# Patient Record
Sex: MALE | Race: WHITE | NOT HISPANIC OR LATINO | Employment: OTHER | URBAN - METROPOLITAN AREA
[De-identification: names, ages, dates, MRNs, and addresses within clinical notes are randomized per-mention and may not be internally consistent; named-entity substitution may affect disease eponyms.]

---

## 2018-02-27 ENCOUNTER — TELEPHONE (OUTPATIENT)
Dept: OTOLARYNGOLOGY | Facility: CLINIC | Age: 73
End: 2018-02-27

## 2018-02-27 NOTE — TELEPHONE ENCOUNTER
----- Message from Madonna Shah sent at 2/27/2018  1:28 PM CST -----  Contact: Sharyn ([facility])  x_  1st Request  _  2nd Request  _  3rd Request     Who:Sharyn ([facility])    Why: Sharyn is checking on the status of a fax for signed prescription that was sent on 2/19/18    What Number to Call Back:[phone]      When to Expect a call back: (Within 24 hours)    Please return the call at earliest convenience. Thanks!

## 2018-02-27 NOTE — TELEPHONE ENCOUNTER
Call Mr Wilson back to inform him that Dr Traylor office is not in today and that  Parker will give him a return call on 02/28

## 2018-03-12 ENCOUNTER — OFFICE VISIT (OUTPATIENT)
Dept: OTOLARYNGOLOGY | Facility: CLINIC | Age: 73
End: 2018-03-12
Payer: MEDICARE

## 2018-03-12 VITALS
HEART RATE: 71 BPM | SYSTOLIC BLOOD PRESSURE: 132 MMHG | TEMPERATURE: 98 F | HEIGHT: 74 IN | DIASTOLIC BLOOD PRESSURE: 76 MMHG

## 2018-03-12 DIAGNOSIS — Z87.19 HX OF GASTROESOPHAGEAL REFLUX (GERD): ICD-10-CM

## 2018-03-12 DIAGNOSIS — G47.33 OSA (OBSTRUCTIVE SLEEP APNEA): Primary | ICD-10-CM

## 2018-03-12 DIAGNOSIS — J30.89 ALLERGIC RHINITIS DUE TO OTHER ALLERGIC TRIGGER, UNSPECIFIED CHRONICITY, UNSPECIFIED SEASONALITY: ICD-10-CM

## 2018-03-12 DIAGNOSIS — Z86.711 HISTORY OF PULMONARY EMBOLUS (PE): ICD-10-CM

## 2018-03-12 DIAGNOSIS — J34.2 NASAL SEPTAL DEVIATION: ICD-10-CM

## 2018-03-12 DIAGNOSIS — I10 ESSENTIAL HYPERTENSION: ICD-10-CM

## 2018-03-12 PROCEDURE — 99204 OFFICE O/P NEW MOD 45 MIN: CPT | Mod: S$GLB,,, | Performed by: OTOLARYNGOLOGY

## 2018-03-12 RX ORDER — CITALOPRAM 10 MG/1
10 TABLET ORAL DAILY
COMMUNITY

## 2018-03-12 RX ORDER — FUROSEMIDE 40 MG/1
40 TABLET ORAL 2 TIMES DAILY
COMMUNITY

## 2018-03-12 RX ORDER — IRBESARTAN 150 MG/1
150 TABLET ORAL NIGHTLY
COMMUNITY

## 2018-03-12 RX ORDER — FLUOROURACIL 50 MG/G
CREAM TOPICAL 2 TIMES DAILY
COMMUNITY

## 2018-03-12 RX ORDER — AZELASTINE 1 MG/ML
2 SPRAY, METERED NASAL 2 TIMES DAILY
Qty: 30 ML | Refills: 3 | Status: SHIPPED | OUTPATIENT
Start: 2018-03-12

## 2018-03-12 RX ORDER — OMEPRAZOLE 20 MG/1
20 CAPSULE, DELAYED RELEASE ORAL DAILY
COMMUNITY

## 2018-03-12 RX ORDER — ALLOPURINOL 100 MG/1
100 TABLET ORAL DAILY
COMMUNITY

## 2018-03-12 RX ORDER — CLOTRIMAZOLE AND BETAMETHASONE DIPROPIONATE 10; .64 MG/G; MG/G
CREAM TOPICAL 2 TIMES DAILY
COMMUNITY

## 2018-03-12 RX ORDER — METOPROLOL SUCCINATE 50 MG/1
50 TABLET, EXTENDED RELEASE ORAL DAILY
COMMUNITY

## 2018-03-12 NOTE — PATIENT INSTRUCTIONS
New CPAP machine ordered.  Continue use whenever sleeping.  Pt to call / notify our office if not received within the next few days.  Follow up after 4 weeks of use.   Azelastine nasal spray 2 sprays in each nostril twice a day as needed.

## 2018-03-13 NOTE — PROGRESS NOTES
Subjective:       Patient ID: Andrea Wilson is a 72 y.o. male.    Chief Complaint: Follow-up (sleep apnea needs new cpap machine)    He is an existing patient of mine last seen in February 2015 for nasal infection with nasal congestion as well as for CPAP supplies.  He has a long history of ADRIENNE on CPAP nightly with good tolerance and good results for many years.  His last PSG in our archived records was in 2000 when his weight was 270 pounds and presently 280 pounds.  See scanned copy in Epic.  He states he uses a Krishnan FX nasal pillows large as his interface for many years with good tolerance.  There is history of nasal septal deviation which he denies any associated symptoms.  He reports tolerating his nasal CPAP well, keeping the mask on all night, no snoring or ADRIENNE symptoms with it in place, feels rested in the morning with no morning headache, and no daytime fatigue.  There is history of GERD but not in some time and has not disturbed sleep.  There is history of occasional hayfever symptoms for which he has taken azelastine nasal spray as needed and has worked well.  Recently he has had problems with the CPAP device turning itself off at various times, but generally this occurs at some point during the night or every other night and he awakens and has to turn machine back on.  He developed pulmonary emboli approximately 6 months ago for which he is now on Eliquis.  He sees his PCP and other physicians regularly at [facility].  He states he has never used tobacco.  There are no other otolaryngologic complaints.          Review of Systems   Ears: Negative for hearing loss, ear pain, ear pressure, ringing in ear, ear discharge, ear infections, dizziness, head trauma, taken gentramycin/streptomycin and family history of hearing loss.    Nose:  Negative for nosebleeds, nasal obstruction, nasal or sinus surgery, loss of smell and postnasal drip.    Mouth/Throat: Negative for pain  swallowing, impaired swallowing, hoarse voice, throat mass, neck mass, oral ulcers and neck lumps.   Constitutional: Negative for recent unexplained weight loss, fever, chills and night sweats.    Eyes:  Negative for history of glaucoma and visual change.   Cardiovascular:  Positive for history of high blood pressure. Negative for chest pain and palpitations.   Respiratory:  Positive for recent cough. Negative for asthma, emphysema, history of tuberculosis and shortness of breath.    Gastrointestinal:  Positive for acid reflux and indigestion. Negative for history of stomach ulcers or pain, blood in stool and change in stool.   Other:  Negative for kidney problem, bladder problem, prostate disease, arthritis, new or changing moles, rash, weakness, disturbances in coordination, slurred, confusion, heat intolerance, cold intolerance, swollen glands, anemia and persistent infections.           Objective:      Physical Exam   Constitutional: He is oriented to person, place, and time. He appears well-developed and well-nourished. No distress.   HENT:   Head: Normocephalic and atraumatic.   Right Ear: Tympanic membrane, external ear and ear canal normal.   Left Ear: Tympanic membrane, external ear and ear canal normal.   Nose: Septal deviation present. No mucosal edema or rhinorrhea. No epistaxis.   Mouth/Throat: Uvula is midline, oropharynx is clear and moist and mucous membranes are normal. No oral lesions. No trismus in the jaw. No uvula swelling. No oropharyngeal exudate, posterior oropharyngeal edema or posterior oropharyngeal erythema.   Eyes: Conjunctivae are normal. Right eye exhibits no discharge. Left eye exhibits no discharge. No scleral icterus.   Neck: Phonation normal. Neck supple. No tracheal deviation present. No thyromegaly present.   Cardiovascular: Normal rate and regular rhythm.    Pulmonary/Chest: Effort normal and breath sounds normal. No stridor. No respiratory distress. He has no wheezes. He has no  rales.   Musculoskeletal: Normal range of motion. He exhibits no edema or deformity.   Lymphadenopathy:     He has no cervical adenopathy.   Neurological: He is alert and oriented to person, place, and time. He displays no weakness. Coordination normal.   Skin: Skin is warm and dry. No rash noted. He is not diaphoretic.   Psychiatric: He has a normal mood and affect. His behavior is normal. His speech is not slurred.       Assessment:       1. ADRIENNE (obstructive sleep apnea)    2. Allergic rhinitis due to other allergic trigger, unspecified chronicity, unspecified seasonality    3. Nasal septal deviation    4. BMI 35.0-35.9,adult    5. Essential hypertension    6. History of pulmonary embolus (PE)    7. Hx of gastroesophageal reflux (GERD)        Plan:        Reviewed all above with patient and considerations and recommendations and answered questions.  Ordered new CPAP machine as well as replacement supplies.  Reordered azelastine nasal spray and reviewed use 2 sprays in each nostril twice a day as needed.  Epistaxis precautions reviewed especially in light of his current anticoagulant.  Understands weight management and its relationship to ADRIENNE as well as his recommended pressure settings.  Understands and reviewed signs and symptoms of untreated or inadequately treated ADRIENNE.  Patient to call if he does not receive his new machine in the next few days.  Otherwise follow-up in 1 month.

## 2018-03-22 ENCOUNTER — TELEPHONE (OUTPATIENT)
Dept: OTOLARYNGOLOGY | Facility: CLINIC | Age: 73
End: 2018-03-22

## 2018-03-22 NOTE — TELEPHONE ENCOUNTER
"----- Message from Sofy Mireles sent at 3/22/2018 10:26 AM CDT -----  Contact: Patient himself  X 1st Request  _  2nd Request  _  3rd Request    Who: Andrea Wilson (mrn# [id number])     Why: Patient called requesting a call. Says, "he has not heard from [facility] yet as this pertains to his CPAP machine."      Please give a call back at your earliest convenience.     THANKS!       What Number to Call Back:  [phone]                              "

## 2018-03-22 NOTE — TELEPHONE ENCOUNTER
Spoke with patients wife told [facility] came in the other day and I gave her all information on Mr. Wilson she said she would take care of

## 2018-04-03 ENCOUNTER — TELEPHONE (OUTPATIENT)
Dept: OTOLARYNGOLOGY | Facility: CLINIC | Age: 73
End: 2018-04-03

## 2018-04-03 NOTE — TELEPHONE ENCOUNTER
----- Message from Arcelia Bajwa sent at 4/3/2018 10:13 AM CDT -----  Contact: pt's wife lolis [phone]        Name of Who is Calling: pt's wife lolis [phone]      What is the request in detail: they think they left the pt's insurance cards there at the office. Please call the wife      Can the clinic reply by [facility]:pt's wife lolis [phone]      What Number to Call Back if not in [facility]:

## 2018-04-03 NOTE — TELEPHONE ENCOUNTER
Talk with Mrs Wilson she understand about the CPAP information she just need to call insurance company for not prior aut but for a claim number.

## 2018-04-03 NOTE — TELEPHONE ENCOUNTER
"----- Message from Sofy Mireles sent at 4/3/2018  2:55 PM CDT -----  Contact: Patient's wife   X  1st Request  _  2nd Request  _  3rd Request    Patient:  Andrea Wilson (mrn# [id number])     Why: Patient's wife called requesting a call. Says, "she still hasn't heard any thing from [facility] yet as this pertains to patient's CPAP machine."  Please give a call back at your earliest convenience.       THANKS!     What Number to Call Back: [phone]                              "

## 2018-04-19 ENCOUNTER — TELEPHONE (OUTPATIENT)
Dept: OTOLARYNGOLOGY | Facility: CLINIC | Age: 73
End: 2018-04-19

## 2018-04-19 NOTE — TELEPHONE ENCOUNTER
Spoke with patients wife re his CPAP machine he has not gotten a call from [facility]/Faxed all again to [facility] with Urgency note with all papers

## 2018-04-19 NOTE — TELEPHONE ENCOUNTER
----- Message from Ino Lobato sent at 4/19/2018  3:56 PM CDT -----  Contact: patients wife            Name of Who is Calling: Rehan      What is the request in detail: patient wife is requesting a call back in reference to the patient is still having an issue getting the cpap from [facility].      Can the clinic reply by [facility]: no      What Number to Call Back [facility] not [facility]: [phone]

## 2018-05-04 ENCOUNTER — TELEPHONE (OUTPATIENT)
Dept: OTOLARYNGOLOGY | Facility: CLINIC | Age: 73
End: 2018-05-04

## 2018-05-04 NOTE — TELEPHONE ENCOUNTER
----- Message from Ino Lobato sent at 5/4/2018 11:53 AM CDT -----  Contact: patients wife            Name of Who is Calling: Vanesa      What is the request in detail: patients wife is requesting a call back in reference to they still haven't received his cpap machine and the provider is requesting additional information.      Can the clinic reply by [facility]: no      What Number to Call Back [facility] not in [facility]: [phone]

## 2018-05-07 NOTE — TELEPHONE ENCOUNTER
Spoke with Marcela at [facility] told of problems having trouble with  getting patients machine maintenced or replaced  and  New supplies been since March and still had not heard from them Marcela now states needed face to face office notes to supply him, faxed over notes and spoke with patient

## 2018-05-17 ENCOUNTER — TELEPHONE (OUTPATIENT)
Dept: OTOLARYNGOLOGY | Facility: CLINIC | Age: 73
End: 2018-05-17

## 2018-05-17 NOTE — TELEPHONE ENCOUNTER
Attempted to call [phone] ext. [phone] letting them know fax was sent to number [phone]. re faxed today per Dr. Traylor.

## 2018-05-17 NOTE — TELEPHONE ENCOUNTER
----- Message from Ino Lobato sent at 5/17/2018  3:46 PM CDT -----  Contact: Martin             Name of Who is Calling: wyatt      What is the request in detail: Martin is faxing a form to the clinic and is requesting that the clinical notes and the form from office visit 3/12/18 be signed by Dr. Traylor and faxed back to [phone]      Can the clinic reply by [facility]: no      What Number to Call Back [facility] not in [facility]: [phone] ext [phone]

## 2018-06-19 ENCOUNTER — TELEPHONE (OUTPATIENT)
Dept: OTOLARYNGOLOGY | Facility: CLINIC | Age: 73
End: 2018-06-19

## 2018-06-19 NOTE — TELEPHONE ENCOUNTER
----- Message from Arcelia Bajwa sent at 6/19/2018  9:33 AM CDT -----  Contact: pt's wife lolis [phone]            Name of Who is Calling: pt's mom lolis [phone]      What is the request in detail: wife states that the cpap supplies people needs verification. Call wife      Can the clinic reply by [facility]: no      What Number to Call Back if not in [facility]: pt's wife lolis [phone]

## 2018-06-21 ENCOUNTER — TELEPHONE (OUTPATIENT)
Dept: OTOLARYNGOLOGY | Facility: CLINIC | Age: 73
End: 2018-06-21

## 2018-06-21 NOTE — TELEPHONE ENCOUNTER
Spoke with Yohana with [facility] regarding Mr Wilson. She received orders on 6/14/18 for CPAP machine and supplies. The orders are over 90 days. She needs a current order for CPAP and supplies with a signed office note. Informed her Dr Traylor is not in the office today. She will be in on Monday. We can send updated signed orders and office note on 6/25/18.

## 2018-06-21 NOTE — TELEPHONE ENCOUNTER
----- Message from Idania Ying sent at 6/21/2018 10:41 AM CDT -----            Name of Who is Calling: Yohana from [facility]    What is the request in detail:  They are calling for a new RX for cpap, the one sent over was outdated, they also need resent office notes to be signed  And she is requesting for Niesha to call her. [phone] her cell phone          What Number to Call Back if not in [facility]:fax [phone]

## 2018-06-28 ENCOUNTER — TELEPHONE (OUTPATIENT)
Dept: OTOLARYNGOLOGY | Facility: CLINIC | Age: 73
End: 2018-06-28

## 2018-06-28 NOTE — TELEPHONE ENCOUNTER
----- Message from Magy Liu RN sent at 6/21/2018  3:48 PM CDT -----  Spoke with Yohana from [facility]. She needs an updated order for CPAP machine and supplies with signed progress note faxed [phone]. The current order is over 90 days    Magy  ----- Message -----  From: Idania Ying  Sent: 6/21/2018  10:41 AM  To: Layton Barroso Staff              Name of Who is Calling: Yohana from [facility]    What is the request in detail:  They are calling for a new RX for cpap, the one sent over was outdated, they also need resent office notes to be signed  And she is requesting for Niesha to call her. [phone] her cell phone          What Number to Call Back if not in [facility]:fax [phone]

## 2018-06-29 ENCOUNTER — TELEPHONE (OUTPATIENT)
Dept: OTOLARYNGOLOGY | Facility: CLINIC | Age: 73
End: 2018-06-29

## 2018-06-29 NOTE — TELEPHONE ENCOUNTER
----- Message from Sofy Mireles sent at 6/29/2018 11:37 AM CDT -----  Contact: Patient's wife      Name of Who is Calling: Patient's wife      What is the request in detail:  Patient's wife called requesting the status of her 's CPAP machine and supplies from [facility].  Please give a call back at your earliest convenience.     THANKS!      Can the clinic reply by [facility]: No      What Number to Call Back: Patient's wife / ph# [phone]

## 2018-06-29 NOTE — TELEPHONE ENCOUNTER
Called [facility] about the status for pt CPAP equipment and they will have JOHANNA Sue the rep assigned to pt case call back with status of CPAP equipment.   Attempted to call pt wife letting her know we contacted the [facility] at [phone] and a message left for the representative to call Dr. Traylor office.

## 2018-07-18 ENCOUNTER — TELEPHONE (OUTPATIENT)
Dept: OTOLARYNGOLOGY | Facility: CLINIC | Age: 73
End: 2018-07-18

## 2018-07-18 NOTE — TELEPHONE ENCOUNTER
----- Message from Anaya Bear sent at 7/18/2018  3:30 PM CDT -----  Contact: Chan from [facility]            Name of Who is Calling:Chan from [facility]      What is the request in detail: Chan would like a call back regarding prescription for CPAP. Please call     Can the clinic reply by [facility]: no    What Number to Call Back [facility] not [facility]: [phone]

## 2018-07-20 ENCOUNTER — TELEPHONE (OUTPATIENT)
Dept: OTOLARYNGOLOGY | Facility: CLINIC | Age: 73
End: 2018-07-20

## 2018-07-20 NOTE — TELEPHONE ENCOUNTER
----- Message from Tami Wright sent at 7/19/2018  4:12 PM CDT -----  Contact: pt's wife            Name of Who is Calling: Vanesa  What is the request in detail:requesting a call back in regards to [facility] stating that did not receive the proper paperwork from physician. Please call and advise      Can the clinic reply by [facility]: no      What Number to Call Back [facility] not [facility]: [phone]

## 2018-07-27 ENCOUNTER — TELEPHONE (OUTPATIENT)
Dept: OTOLARYNGOLOGY | Facility: CLINIC | Age: 73
End: 2018-07-27

## 2018-07-27 NOTE — TELEPHONE ENCOUNTER
Talk with [facility] talk with [facility] she inform me that They have the script for the CPAP machine and that the hold up was for head gear that is on back order.

## 2018-08-02 ENCOUNTER — TELEPHONE (OUTPATIENT)
Dept: OTOLARYNGOLOGY | Facility: CLINIC | Age: 73
End: 2018-08-02

## 2018-08-02 DIAGNOSIS — G47.33 OSA ON CPAP: Primary | ICD-10-CM

## 2018-08-31 ENCOUNTER — TELEPHONE (OUTPATIENT)
Dept: OTOLARYNGOLOGY | Facility: CLINIC | Age: 73
End: 2018-08-31

## 2018-08-31 NOTE — TELEPHONE ENCOUNTER
----- Message from Idania Ying sent at 8/31/2018  1:17 PM CDT -----            Name of Who is Calling: Exec    What is the request in detail:they are calling to see if you received the letter of medical necessity fax [phone]  They are refaxing a new one    What Number to Call Back [phone]

## 2018-08-31 NOTE — TELEPHONE ENCOUNTER
Attempted to call pt today letting him know Dr. Traylor has the paper work but she's out of the office today. Message sent to her MA.

## 2018-09-10 ENCOUNTER — TELEPHONE (OUTPATIENT)
Dept: OTOLARYNGOLOGY | Facility: CLINIC | Age: 73
End: 2018-09-10

## 2018-09-10 NOTE — TELEPHONE ENCOUNTER
----- Message from Quyen Arizmendi sent at 9/10/2018  4:30 PM CDT -----  Contact: Laila from [facility]   Name of Who is Calling: Laila from [facility]     What is the request in detail:Laila from [facility]  Is requesting a letter of care for CPAP supplies.. Laila from [facility] will be re faxing form ...... Please contact to further discuss and advise      Can the clinic reply by [facility]: No     What Number to Call Back if not in [facility]: [phone]

## 2018-09-10 NOTE — TELEPHONE ENCOUNTER
Spoke with [facility] needed 2 more forms to be signed for his milind had her sign and date and faxed back to Kirstin

## 2019-12-01 ENCOUNTER — HOSPITAL ENCOUNTER (EMERGENCY)
Facility: HOSPITAL | Age: 74
Discharge: HOME | End: 2019-12-01
Attending: EMERGENCY MEDICINE
Payer: MEDICARE

## 2019-12-01 VITALS
BODY MASS INDEX: 38.24 KG/M2 | SYSTOLIC BLOOD PRESSURE: 149 MMHG | HEART RATE: 76 BPM | RESPIRATION RATE: 19 BRPM | OXYGEN SATURATION: 97 % | DIASTOLIC BLOOD PRESSURE: 70 MMHG | TEMPERATURE: 99 F | WEIGHT: 298 LBS | HEIGHT: 74 IN

## 2019-12-01 DIAGNOSIS — M25.022: ICD-10-CM

## 2019-12-01 DIAGNOSIS — S52.022A CLOSED FRACTURE OF OLECRANON PROCESS OF LEFT ULNA, INITIAL ENCOUNTER: Primary | ICD-10-CM
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
## 2019-12-01 PROCEDURE — 99283 EMERGENCY DEPT VISIT LOW MDM: CPT | Mod: 25

## 2019-12-01 PROCEDURE — 29105 APPLICATION LONG ARM SPLINT: CPT | Mod: LT

## 2019-12-01 PROCEDURE — 73080 X-RAY EXAM OF ELBOW: CPT | Mod: TC,FY,LT

## 2019-12-01 PROCEDURE — 73080 XR ELBOW COMPLETE 3 VIEW LEFT: ICD-10-PCS | Mod: 26,LT,, | Performed by: RADIOLOGY

## 2019-12-01 PROCEDURE — 73080 X-RAY EXAM OF ELBOW: CPT | Mod: 26,LT,, | Performed by: RADIOLOGY

## 2019-12-01 RX ORDER — OXYCODONE AND ACETAMINOPHEN 5; 325 MG/1; MG/1
1 TABLET ORAL EVERY 6 HOURS PRN
Qty: 12 TABLET | Refills: 0 | Status: SHIPPED | OUTPATIENT
Start: 2019-12-01 | End: 2019-12-04

## 2019-12-01 NOTE — PROVIDER PROGRESS NOTES - EMERGENCY DEPT.
Encounter Date: 12/1/2019    ED Physician Progress Notes        Spoke with Dr. Aida Chaney at [facility], who will have his office contact the patient for follow up in clinic on Monday. Attempted to contact the patient at the cell and home numbers listed on chart; however, I am unable to do so at this time as the cell number is not operational and there is no voicemail set up with the home number.  Will continue to attempt to contact at home number.

## 2019-12-03 NOTE — ED PROVIDER NOTES
Encounter Date: 12/1/2019       History     Chief Complaint   Patient presents with    Arm Pain     patient c\o a fall last sunday and fell on his left elbow causing an abrasion. now patient c\o pain to the elbow, not being able to push anything, and a bruise to the inner arm      73yo male with pmh DVT on chronic anticoagulation and HTN presents to ED for evaluation of constant and worsening left elbow pain with bruising. States he fell last Sunday after getting tangled up in a garden hose and struck his left elbow on concrete. Pain is aching, constant, and worsened with pushing. Denies numbness, tingling. Denies striking his head, LOC, neck or back pain.         Review of patient's allergies indicates:  No Known Allergies  Past Medical History:   Diagnosis Date    DVT (deep venous thrombosis)     Hypertension     Kidney stone     Ruptured lumbar disc      Past Surgical History:   Procedure Laterality Date    BACK SURGERY       History reviewed. No pertinent family history.  Social History     Tobacco Use    Smoking status: Never Smoker    Smokeless tobacco: Never Used   Substance Use Topics    Alcohol use: Yes     Comment: beer occasionally, 1 today    Drug use: No     Review of Systems   Constitutional: Negative for appetite change, chills, diaphoresis, fatigue and fever.   HENT: Negative for congestion, ear pain, rhinorrhea, sinus pressure, sinus pain, sore throat and tinnitus.    Eyes: Negative for photophobia and visual disturbance.   Respiratory: Negative for cough, chest tightness, shortness of breath and wheezing.    Cardiovascular: Negative for chest pain, palpitations and leg swelling.   Gastrointestinal: Negative for abdominal pain, constipation, diarrhea, nausea and vomiting.   Endocrine: Negative for cold intolerance, heat intolerance, polydipsia, polyphagia and polyuria.   Genitourinary: Negative for decreased urine volume, difficulty urinating, dysuria, flank pain, frequency, hematuria and  urgency.   Musculoskeletal: Positive for arthralgias and joint swelling. Negative for back pain, gait problem, myalgias, neck pain and neck stiffness.   Skin: Positive for color change. Negative for pallor, rash and wound.   Allergic/Immunologic: Negative for immunocompromised state.   Neurological: Negative for dizziness, syncope, weakness, light-headedness, numbness and headaches.   Hematological: Negative for adenopathy. Does not bruise/bleed easily.   Psychiatric/Behavioral: Negative for decreased concentration, dysphoric mood and sleep disturbance. The patient is not nervous/anxious.    All other systems reviewed and are negative.      Physical Exam     Initial Vitals [12/01/19 0925]   BP Pulse Resp Temp SpO2   (!) 149/70 76 19 98.7 °F (37.1 °C) 97 %      MAP       --         Physical Exam    Nursing note and vitals reviewed.  Constitutional: He appears well-developed and well-nourished. He is not diaphoretic. No distress.   HENT:   Head: Normocephalic and atraumatic.   Right Ear: External ear normal.   Left Ear: External ear normal.   Nose: Nose normal.   Mouth/Throat: Oropharynx is clear and moist.   Eyes: Conjunctivae are normal. Pupils are equal, round, and reactive to light. No scleral icterus.   Neck: Normal range of motion. Neck supple. No JVD present.   Cardiovascular: Normal rate, regular rhythm, normal heart sounds and intact distal pulses.   Pulmonary/Chest: Breath sounds normal. No respiratory distress. He has no wheezes. He has no rhonchi. He has no rales. He exhibits no tenderness.   Abdominal: Soft. Bowel sounds are normal. He exhibits no distension. There is no tenderness. There is no rebound and no guarding.   Musculoskeletal: Normal range of motion. He exhibits edema and tenderness.   Lymphadenopathy:     He has no cervical adenopathy.   Neurological: He is alert and oriented to person, place, and time. GCS score is 15. GCS eye subscore is 4. GCS verbal subscore is 5. GCS motor subscore is 6.    Skin: Skin is warm and dry. Capillary refill takes less than 2 seconds. No rash and no abscess noted. No erythema. No pallor.   Diffuse ecchymosis over medial left elbow extending from mid-forearm to mid-bicep   Psychiatric: He has a normal mood and affect. His behavior is normal. Judgment and thought content normal.         ED Course   Procedures  Labs Reviewed - No data to display       Imaging Results          X-Ray Elbow Complete Left (Final result)  Result time 12/01/19 10:14:27    Final result by Robert Tate Jr., MD (12/01/19 10:14:27)                 Impression:      Fracture and displacement of a large olecranon spur and the adjacent portion of the olecranon with proximal shift.  Soft tissue swelling noted.      Electronically signed by: Robert Tate MD  Date:    12/01/2019  Time:    10:14             Narrative:    EXAMINATION:  XR ELBOW COMPLETE 3 VIEW LEFT    CLINICAL HISTORY:  Unspecified fall, initial encounter    TECHNIQUE:  AP, lateral, and oblique views of the left elbow were performed.    COMPARISON:  None    FINDINGS:  At the olecranon there is fracture and displacement of a portion of the left olecranon and a large bone spur, shifted proximally.  Soft tissue swelling is noted.  A fracture of the humerus or radius is not seen.                                 Medical Decision Making:   Differential Diagnosis:   Fracture, contusion, dislocation, hemarthrosis  ED Management:  Katherine consult to [facility] on call - Dr Parra agreed with the plan of treatment (posterior long arm splint) and requested a secure chat be sent to him as a reminder to have his office call the patient for a follow up as the patient is returning to [place] today.   Patient instructed to look for follow up as he may need surgical intervention.   Splint applied by tech and RN, neurovascularly intact after splinting.   Advised of return precautions.                                  Clinical Impression:        ICD-10-CM ICD-9-CM   1. Closed fracture of olecranon process of left ulna, initial encounter S52.022A 813.01   2. Fall W19.XXXA E888.9   3. Hemarthrosis involving elbow joint, left M25.022 719.12         Disposition:   Disposition: Discharged  Condition: Stable                     Patricia Christian MD  12/03/19 0024

## 2019-12-04 ENCOUNTER — TELEPHONE (OUTPATIENT)
Dept: ORTHOPEDICS | Facility: CLINIC | Age: 74
End: 2019-12-04

## 2019-12-04 NOTE — TELEPHONE ENCOUNTER
Unable to reach pt.    Cell number is not in service.   No answer at home number and memory is full on voice mail.

## 2020-02-22 ENCOUNTER — HOSPITAL ENCOUNTER (EMERGENCY)
Facility: HOSPITAL | Age: 75
Discharge: HOME | End: 2020-02-22
Attending: INTERNAL MEDICINE
Payer: MEDICARE

## 2020-02-22 VITALS
OXYGEN SATURATION: 99 % | WEIGHT: 298 LBS | SYSTOLIC BLOOD PRESSURE: 155 MMHG | HEART RATE: 51 BPM | RESPIRATION RATE: 12 BRPM | DIASTOLIC BLOOD PRESSURE: 70 MMHG | HEIGHT: 74 IN | BODY MASS INDEX: 38.24 KG/M2 | TEMPERATURE: 98 F

## 2020-02-22 DIAGNOSIS — F10.920 ALCOHOLIC INTOXICATION WITHOUT COMPLICATION: Primary | ICD-10-CM

## 2020-02-22 DIAGNOSIS — F41.9 ANXIETY AND DEPRESSION: ICD-10-CM

## 2020-02-22 DIAGNOSIS — F32.A ANXIETY AND DEPRESSION: ICD-10-CM

## 2020-02-22 DIAGNOSIS — R07.2 PRECORDIAL PAIN: ICD-10-CM

## 2020-02-22 DIAGNOSIS — I49.8 FLUTTERING HEART: ICD-10-CM

## 2020-02-22 LAB
ALBUMIN SERPL BCP-MCNC: 3.9 G/DL (ref 3.5–5.2)
ALP SERPL-CCNC: 87 U/L (ref 55–135)
ALT SERPL W/O P-5'-P-CCNC: 32 U/L (ref 10–44)
ANION GAP SERPL CALC-SCNC: 7 MMOL/L (ref 8–16)
AST SERPL-CCNC: 33 U/L (ref 10–40)
BASOPHILS # BLD AUTO: 0.06 K/UL (ref 0–0.2)
BASOPHILS NFR BLD: 0.9 % (ref 0–1.9)
BILIRUB SERPL-MCNC: 0.6 MG/DL (ref 0.1–1)
BNP SERPL-MCNC: 22 PG/ML (ref 0–99)
BUN SERPL-MCNC: 18 MG/DL (ref 8–23)
CALCIUM SERPL-MCNC: 8.3 MG/DL (ref 8.7–10.5)
CHLORIDE SERPL-SCNC: 109 MMOL/L (ref 95–110)
CO2 SERPL-SCNC: 25 MMOL/L (ref 23–29)
CREAT SERPL-MCNC: 0.8 MG/DL (ref 0.5–1.4)
DIFFERENTIAL METHOD: ABNORMAL
EOSINOPHIL # BLD AUTO: 0.1 K/UL (ref 0–0.5)
EOSINOPHIL NFR BLD: 1.1 % (ref 0–8)
ERYTHROCYTE [DISTWIDTH] IN BLOOD BY AUTOMATED COUNT: 16.4 % (ref 11.5–14.5)
EST. GFR  (AFRICAN AMERICAN): >60 ML/MIN/1.73 M^2
EST. GFR  (NON AFRICAN AMERICAN): >60 ML/MIN/1.73 M^2
ETHANOL SERPL-MCNC: 202 MG/DL
GLUCOSE SERPL-MCNC: 91 MG/DL (ref 70–110)
HCT VFR BLD AUTO: 34.9 % (ref 40–54)
HGB BLD-MCNC: 10.6 G/DL (ref 14–18)
IMM GRANULOCYTES # BLD AUTO: 0.03 K/UL (ref 0–0.04)
IMM GRANULOCYTES NFR BLD AUTO: 0.4 % (ref 0–0.5)
INR PPP: 1.1 (ref 0.8–1.2)
LYMPHOCYTES # BLD AUTO: 2.1 K/UL (ref 1–4.8)
LYMPHOCYTES NFR BLD: 29.6 % (ref 18–48)
MCH RBC QN AUTO: 25.1 PG (ref 27–31)
MCHC RBC AUTO-ENTMCNC: 30.4 G/DL (ref 32–36)
MCV RBC AUTO: 83 FL (ref 82–98)
MONOCYTES # BLD AUTO: 0.8 K/UL (ref 0.3–1)
MONOCYTES NFR BLD: 11.8 % (ref 4–15)
NEUTROPHILS # BLD AUTO: 4 K/UL (ref 1.8–7.7)
NEUTROPHILS NFR BLD: 56.2 % (ref 38–73)
NRBC BLD-RTO: 0 /100 WBC
PLATELET # BLD AUTO: 227 K/UL (ref 150–350)
PMV BLD AUTO: 10 FL (ref 9.2–12.9)
POTASSIUM SERPL-SCNC: 3.8 MMOL/L (ref 3.5–5.1)
PROT SERPL-MCNC: 6.8 G/DL (ref 6–8.4)
PROTHROMBIN TIME: 11.9 SEC (ref 9–12.5)
RBC # BLD AUTO: 4.23 M/UL (ref 4.6–6.2)
SODIUM SERPL-SCNC: 141 MMOL/L (ref 136–145)
TROPONIN I SERPL DL<=0.01 NG/ML-MCNC: 0.01 NG/ML (ref 0.02–0.5)
WBC # BLD AUTO: 7.05 K/UL (ref 3.9–12.7)

## 2020-02-22 PROCEDURE — 93005 ELECTROCARDIOGRAM TRACING: CPT

## 2020-02-22 PROCEDURE — 85610 PROTHROMBIN TIME: CPT

## 2020-02-22 PROCEDURE — 71045 X-RAY EXAM CHEST 1 VIEW: CPT | Mod: TC,FY

## 2020-02-22 PROCEDURE — 99283 EMERGENCY DEPT VISIT LOW MDM: CPT | Mod: 25

## 2020-02-22 PROCEDURE — 85025 COMPLETE CBC W/AUTO DIFF WBC: CPT

## 2020-02-22 PROCEDURE — 71045 XR CHEST AP PORTABLE: ICD-10-PCS | Mod: 26,,, | Performed by: RADIOLOGY

## 2020-02-22 PROCEDURE — 80053 COMPREHEN METABOLIC PANEL: CPT

## 2020-02-22 PROCEDURE — 84484 ASSAY OF TROPONIN QUANT: CPT

## 2020-02-22 PROCEDURE — 80320 DRUG SCREEN QUANTALCOHOLS: CPT

## 2020-02-22 PROCEDURE — 83880 ASSAY OF NATRIURETIC PEPTIDE: CPT

## 2020-02-22 PROCEDURE — 71045 X-RAY EXAM CHEST 1 VIEW: CPT | Mod: 26,,, | Performed by: RADIOLOGY

## 2020-02-22 NOTE — ED TRIAGE NOTES
"Pt reports depression today leading to issues with heart "fluttering" and "confusion", pts family reports patient turned "pale" and grabbed chest. Pt reports recent stress leading to depression. Reports + ETOH today.  "

## 2020-02-23 NOTE — ED PROVIDER NOTES
"ZEncounter Date: 2/22/2020       History     Chief Complaint   Patient presents with    Chest Pain     pt reports episode of chest pain pta and "fluttering" of heart, pt's family reports patient "turned pale" x3 times    Alcohol Intoxication     family reports +etoh today     Patient is a 74-year-old male that presented with complaints of just feeling down.  Patient states he has been drinking beer today and it became really depressed due to multiple stressors in his life.  Patient states has a history of atrial fibrillation and has had no chest pain according to him.  Patient has history of DVT, hypertension, kidney stones, ruptured lumbar disc and atrial fibrillation which she is anticoagulated with Eliquis.  Patient states th he has been on an antidepressant recently.  But has not been compliant        Review of patient's allergies indicates:  No Known Allergies  Past Medical History:   Diagnosis Date    DVT (deep venous thrombosis)     Hypertension     Kidney stone     Ruptured lumbar disc      Past Surgical History:   Procedure Laterality Date    BACK SURGERY       History reviewed. No pertinent family history.  Social History     Tobacco Use    Smoking status: Never Smoker    Smokeless tobacco: Never Used   Substance Use Topics    Alcohol use: Yes     Comment: beer occasionally, 1 today    Drug use: No     Review of Systems   Constitutional: Positive for fatigue. Negative for activity change, appetite change, chills, diaphoresis, fever and unexpected weight change.   HENT: Negative for congestion, ear discharge, nosebleeds, postnasal drip, sinus pressure, sneezing and sore throat.    Eyes: Negative for photophobia, pain, discharge, redness, itching and visual disturbance.   Respiratory: Positive for chest tightness and shortness of breath. Negative for apnea, cough, choking, wheezing and stridor.    Cardiovascular: Positive for leg swelling. Negative for chest pain and palpitations. "   Gastrointestinal: Positive for abdominal pain. Negative for abdominal distention, blood in stool and constipation.   Endocrine: Negative for cold intolerance, heat intolerance, polydipsia and polyphagia.   Genitourinary: Negative for difficulty urinating, flank pain and frequency.   Musculoskeletal: Positive for arthralgias and back pain. Negative for gait problem, joint swelling, myalgias and neck pain.   Skin: Negative for color change and pallor.   Neurological: Negative for dizziness, facial asymmetry and headaches.   Psychiatric/Behavioral: Positive for dysphoric mood. Negative for behavioral problems. The patient is nervous/anxious.        Physical Exam     Initial Vitals [02/22/20 1752]   BP Pulse Resp Temp SpO2   (!) 158/80 62 18 98 °F (36.7 °C) 98 %      MAP       --         Physical Exam    Nursing note and vitals reviewed.  Constitutional: He appears well-developed and well-nourished.   HENT:   Head: Normocephalic and atraumatic.   Eyes: EOM are normal.   Neck: Normal range of motion. Neck supple.   Cardiovascular: Normal rate. An irregularly irregular rhythm present.  Exam reveals distant heart sounds.    Pulmonary/Chest: Effort normal and breath sounds normal.   Abdominal: Soft. Bowel sounds are normal.   Neurological: He is oriented to person, place, and time.   Skin: Skin is warm and dry. Capillary refill takes less than 2 seconds.         ED Course   Procedures  Labs Reviewed   CBC W/ AUTO DIFFERENTIAL   COMPREHENSIVE METABOLIC PANEL   B-TYPE NATRIURETIC PEPTIDE   TROPONIN I   PROTIME-INR     EKG Readings: (Independently Interpreted)   Initial Reading: No STEMI. Rhythm: Sinus Bradycardia.   Sinus bradycardia with premature atrial complexes.  There is a left axis deviation and criteria for right bundle branch block.  Otherwise no acute ST T wave changes noted.       Imaging Results    None       X-Rays:   Independently Interpreted Readings:   Other Readings:  No acute findings on chest x-ray.  Mild  cardiomegaly with no infiltrates no effusion no other chest disease                      ED Course as of Feb 22 2024   Sat Feb 22, 2020 1920 EKG 12-lead [JK]   1922 ECG reveals sinus bradycardia with premature atrial complexes left axis deviation and right bundle branch block otherwise no acute ST T wave changes.   EKG 12-lead [JK]      ED Course User Index  [JK] Babar Nelson MD                Clinical Impression:       ICD-10-CM ICD-9-CM   1. Alcoholic intoxication without complication F10.920 305.00   2. Fluttering heart I49.8 427.42   3. Precordial pain R07.2 786.51   4. Anxiety and depression F41.9 300.00    F32.9 311                             Babar Nelson MD  02/22/20 2029